# Patient Record
Sex: MALE | Race: BLACK OR AFRICAN AMERICAN | Employment: UNEMPLOYED | URBAN - METROPOLITAN AREA
[De-identification: names, ages, dates, MRNs, and addresses within clinical notes are randomized per-mention and may not be internally consistent; named-entity substitution may affect disease eponyms.]

---

## 2020-08-25 ENCOUNTER — APPOINTMENT (OUTPATIENT)
Dept: GENERAL RADIOLOGY | Age: 65
DRG: 871 | End: 2020-08-25

## 2020-08-25 ENCOUNTER — HOSPITAL ENCOUNTER (INPATIENT)
Age: 65
LOS: 3 days | Discharge: HOME | DRG: 871 | End: 2020-08-28
Attending: EMERGENCY MEDICINE | Admitting: INTERNAL MEDICINE

## 2020-08-25 ENCOUNTER — APPOINTMENT (OUTPATIENT)
Dept: CT IMAGING | Age: 65
DRG: 871 | End: 2020-08-25

## 2020-08-25 PROBLEM — R06.00 DYSPNEA: Status: ACTIVE | Noted: 2020-08-25

## 2020-08-25 LAB
ANION GAP SERPL CALCULATED.3IONS-SCNC: 12 MMOL/L (ref 3–16)
BASE EXCESS VENOUS: 2 MMOL/L (ref -2–3)
BASOPHILS ABSOLUTE: 0.1 K/UL (ref 0–0.2)
BASOPHILS RELATIVE PERCENT: 0.5 %
BUN BLDV-MCNC: 10 MG/DL (ref 7–20)
CALCIUM SERPL-MCNC: 9.6 MG/DL (ref 8.3–10.6)
CARBOXYHEMOGLOBIN: 2.3 % (ref 0–1.5)
CHLORIDE BLD-SCNC: 101 MMOL/L (ref 99–110)
CO2: 25 MMOL/L (ref 21–32)
CREAT SERPL-MCNC: 0.9 MG/DL (ref 0.8–1.3)
EKG ATRIAL RATE: 96 BPM
EKG DIAGNOSIS: NORMAL
EKG P AXIS: 81 DEGREES
EKG P-R INTERVAL: 132 MS
EKG Q-T INTERVAL: 368 MS
EKG QRS DURATION: 92 MS
EKG QTC CALCULATION (BAZETT): 464 MS
EKG R AXIS: -7 DEGREES
EKG T AXIS: 25 DEGREES
EKG VENTRICULAR RATE: 96 BPM
EOSINOPHILS ABSOLUTE: 0 K/UL (ref 0–0.6)
EOSINOPHILS RELATIVE PERCENT: 0.1 %
GFR AFRICAN AMERICAN: >60
GFR NON-AFRICAN AMERICAN: >60
GLUCOSE BLD-MCNC: 118 MG/DL (ref 70–99)
HCO3 VENOUS: 24.6 MMOL/L (ref 24–28)
HCT VFR BLD CALC: 45.9 % (ref 40.5–52.5)
HEMOGLOBIN, VEN, REDUCED: 0.7 %
HEMOGLOBIN: 15.7 G/DL (ref 13.5–17.5)
LACTIC ACID: 1.9 MMOL/L (ref 0.4–2)
LYMPHOCYTES ABSOLUTE: 2.8 K/UL (ref 1–5.1)
LYMPHOCYTES RELATIVE PERCENT: 13.8 %
MAGNESIUM: 1.9 MG/DL (ref 1.8–2.4)
MCH RBC QN AUTO: 32.6 PG (ref 26–34)
MCHC RBC AUTO-ENTMCNC: 34.2 G/DL (ref 31–36)
MCV RBC AUTO: 95.6 FL (ref 80–100)
METHEMOGLOBIN VENOUS: 0.4 % (ref 0–1.5)
MONOCYTES ABSOLUTE: 1.3 K/UL (ref 0–1.3)
MONOCYTES RELATIVE PERCENT: 6.5 %
NEUTROPHILS ABSOLUTE: 16.3 K/UL (ref 1.7–7.7)
NEUTROPHILS RELATIVE PERCENT: 79.1 %
O2 SAT, VEN: 99 %
PCO2, VEN: 33.9 MMHG (ref 41–51)
PDW BLD-RTO: 12.7 % (ref 12.4–15.4)
PH VENOUS: 7.47 (ref 7.35–7.45)
PLATELET # BLD: 226 K/UL (ref 135–450)
PMV BLD AUTO: 8.8 FL (ref 5–10.5)
PO2, VEN: 131 MMHG (ref 25–40)
POTASSIUM REFLEX MAGNESIUM: 3.5 MMOL/L (ref 3.5–5.1)
PRO-BNP: 197 PG/ML (ref 0–124)
RBC # BLD: 4.81 M/UL (ref 4.2–5.9)
SLIDE REVIEW: ABNORMAL
SODIUM BLD-SCNC: 138 MMOL/L (ref 136–145)
TCO2 CALC VENOUS: 26 MMOL/L
TROPONIN: <0.01 NG/ML
WBC # BLD: 20.6 K/UL (ref 4–11)

## 2020-08-25 PROCEDURE — 83605 ASSAY OF LACTIC ACID: CPT

## 2020-08-25 PROCEDURE — 6360000002 HC RX W HCPCS

## 2020-08-25 PROCEDURE — 2060000000 HC ICU INTERMEDIATE R&B

## 2020-08-25 PROCEDURE — C9113 INJ PANTOPRAZOLE SODIUM, VIA: HCPCS | Performed by: INTERNAL MEDICINE

## 2020-08-25 PROCEDURE — 71045 X-RAY EXAM CHEST 1 VIEW: CPT

## 2020-08-25 PROCEDURE — 86702 HIV-2 ANTIBODY: CPT

## 2020-08-25 PROCEDURE — 2580000003 HC RX 258: Performed by: STUDENT IN AN ORGANIZED HEALTH CARE EDUCATION/TRAINING PROGRAM

## 2020-08-25 PROCEDURE — 87390 HIV-1 AG IA: CPT

## 2020-08-25 PROCEDURE — 85025 COMPLETE CBC W/AUTO DIFF WBC: CPT

## 2020-08-25 PROCEDURE — 82803 BLOOD GASES ANY COMBINATION: CPT

## 2020-08-25 PROCEDURE — 2500000003 HC RX 250 WO HCPCS: Performed by: INTERNAL MEDICINE

## 2020-08-25 PROCEDURE — 36415 COLL VENOUS BLD VENIPUNCTURE: CPT

## 2020-08-25 PROCEDURE — 86701 HIV-1ANTIBODY: CPT

## 2020-08-25 PROCEDURE — U0003 INFECTIOUS AGENT DETECTION BY NUCLEIC ACID (DNA OR RNA); SEVERE ACUTE RESPIRATORY SYNDROME CORONAVIRUS 2 (SARS-COV-2) (CORONAVIRUS DISEASE [COVID-19]), AMPLIFIED PROBE TECHNIQUE, MAKING USE OF HIGH THROUGHPUT TECHNOLOGIES AS DESCRIBED BY CMS-2020-01-R: HCPCS

## 2020-08-25 PROCEDURE — 6360000002 HC RX W HCPCS: Performed by: INTERNAL MEDICINE

## 2020-08-25 PROCEDURE — 94664 DEMO&/EVAL PT USE INHALER: CPT

## 2020-08-25 PROCEDURE — 99285 EMERGENCY DEPT VISIT HI MDM: CPT

## 2020-08-25 PROCEDURE — 2580000003 HC RX 258: Performed by: INTERNAL MEDICINE

## 2020-08-25 PROCEDURE — 94150 VITAL CAPACITY TEST: CPT

## 2020-08-25 PROCEDURE — 83880 ASSAY OF NATRIURETIC PEPTIDE: CPT

## 2020-08-25 PROCEDURE — 6360000002 HC RX W HCPCS: Performed by: STUDENT IN AN ORGANIZED HEALTH CARE EDUCATION/TRAINING PROGRAM

## 2020-08-25 PROCEDURE — 71275 CT ANGIOGRAPHY CHEST: CPT

## 2020-08-25 PROCEDURE — 93005 ELECTROCARDIOGRAM TRACING: CPT | Performed by: STUDENT IN AN ORGANIZED HEALTH CARE EDUCATION/TRAINING PROGRAM

## 2020-08-25 PROCEDURE — 2580000003 HC RX 258

## 2020-08-25 PROCEDURE — 87205 SMEAR GRAM STAIN: CPT

## 2020-08-25 PROCEDURE — 80048 BASIC METABOLIC PNL TOTAL CA: CPT

## 2020-08-25 PROCEDURE — 6360000004 HC RX CONTRAST MEDICATION: Performed by: STUDENT IN AN ORGANIZED HEALTH CARE EDUCATION/TRAINING PROGRAM

## 2020-08-25 PROCEDURE — 0100U HC RESPIRPTHGN MULT REV TRANS & AMP PRB TECH 21 TRGT: CPT

## 2020-08-25 PROCEDURE — 6370000000 HC RX 637 (ALT 250 FOR IP)

## 2020-08-25 PROCEDURE — 87070 CULTURE OTHR SPECIMN AEROBIC: CPT

## 2020-08-25 PROCEDURE — U0002 COVID-19 LAB TEST NON-CDC: HCPCS

## 2020-08-25 PROCEDURE — 83735 ASSAY OF MAGNESIUM: CPT

## 2020-08-25 PROCEDURE — 84484 ASSAY OF TROPONIN QUANT: CPT

## 2020-08-25 RX ORDER — SODIUM CHLORIDE 9 MG/ML
INJECTION, SOLUTION INTRAVENOUS CONTINUOUS
Status: DISCONTINUED | OUTPATIENT
Start: 2020-08-25 | End: 2020-08-25

## 2020-08-25 RX ORDER — ACETAMINOPHEN 650 MG/1
650 SUPPOSITORY RECTAL EVERY 6 HOURS PRN
Status: DISCONTINUED | OUTPATIENT
Start: 2020-08-25 | End: 2020-08-28 | Stop reason: HOSPADM

## 2020-08-25 RX ORDER — DEXTROSE, SODIUM CHLORIDE, SODIUM LACTATE, POTASSIUM CHLORIDE, AND CALCIUM CHLORIDE 5; .6; .31; .03; .02 G/100ML; G/100ML; G/100ML; G/100ML; G/100ML
INJECTION, SOLUTION INTRAVENOUS CONTINUOUS
Status: DISCONTINUED | OUTPATIENT
Start: 2020-08-25 | End: 2020-08-28

## 2020-08-25 RX ORDER — SODIUM CHLORIDE 0.9 % (FLUSH) 0.9 %
10 SYRINGE (ML) INJECTION EVERY 12 HOURS SCHEDULED
Status: DISCONTINUED | OUTPATIENT
Start: 2020-08-25 | End: 2020-08-28 | Stop reason: HOSPADM

## 2020-08-25 RX ORDER — PANTOPRAZOLE SODIUM 40 MG/10ML
40 INJECTION, POWDER, LYOPHILIZED, FOR SOLUTION INTRAVENOUS 2 TIMES DAILY
Status: DISCONTINUED | OUTPATIENT
Start: 2020-08-25 | End: 2020-08-27

## 2020-08-25 RX ORDER — POLYETHYLENE GLYCOL 3350 17 G/17G
17 POWDER, FOR SOLUTION ORAL DAILY PRN
Status: DISCONTINUED | OUTPATIENT
Start: 2020-08-25 | End: 2020-08-28 | Stop reason: HOSPADM

## 2020-08-25 RX ORDER — ACETAMINOPHEN 325 MG/1
650 TABLET ORAL EVERY 6 HOURS PRN
Status: DISCONTINUED | OUTPATIENT
Start: 2020-08-25 | End: 2020-08-28 | Stop reason: HOSPADM

## 2020-08-25 RX ORDER — SODIUM CHLORIDE 0.9 % (FLUSH) 0.9 %
10 SYRINGE (ML) INJECTION PRN
Status: DISCONTINUED | OUTPATIENT
Start: 2020-08-25 | End: 2020-08-28 | Stop reason: HOSPADM

## 2020-08-25 RX ORDER — PROMETHAZINE HYDROCHLORIDE 25 MG/1
12.5 TABLET ORAL EVERY 6 HOURS PRN
Status: DISCONTINUED | OUTPATIENT
Start: 2020-08-25 | End: 2020-08-28 | Stop reason: HOSPADM

## 2020-08-25 RX ORDER — PANTOPRAZOLE SODIUM 40 MG/10ML
40 INJECTION, POWDER, LYOPHILIZED, FOR SOLUTION INTRAVENOUS DAILY
Status: DISCONTINUED | OUTPATIENT
Start: 2020-08-25 | End: 2020-08-25

## 2020-08-25 RX ORDER — ONDANSETRON 2 MG/ML
4 INJECTION INTRAMUSCULAR; INTRAVENOUS EVERY 6 HOURS PRN
Status: DISCONTINUED | OUTPATIENT
Start: 2020-08-25 | End: 2020-08-28 | Stop reason: HOSPADM

## 2020-08-25 RX ADMIN — PIPERACILLIN AND TAZOBACTAM 3.38 G: 3; .375 INJECTION, POWDER, LYOPHILIZED, FOR SOLUTION INTRAVENOUS at 17:02

## 2020-08-25 RX ADMIN — FOLIC ACID: 5 INJECTION, SOLUTION INTRAMUSCULAR; INTRAVENOUS; SUBCUTANEOUS at 17:06

## 2020-08-25 RX ADMIN — CEFEPIME HYDROCHLORIDE 1 G: 1 INJECTION, POWDER, FOR SOLUTION INTRAMUSCULAR; INTRAVENOUS at 12:48

## 2020-08-25 RX ADMIN — ENOXAPARIN SODIUM 40 MG: 40 INJECTION SUBCUTANEOUS at 15:58

## 2020-08-25 RX ADMIN — SODIUM CHLORIDE: 9 INJECTION, SOLUTION INTRAVENOUS at 15:58

## 2020-08-25 RX ADMIN — PANTOPRAZOLE SODIUM 40 MG: 40 INJECTION, POWDER, FOR SOLUTION INTRAVENOUS at 15:58

## 2020-08-25 RX ADMIN — IOPAMIDOL 80 ML: 755 INJECTION, SOLUTION INTRAVENOUS at 11:23

## 2020-08-25 RX ADMIN — SODIUM CHLORIDE, SODIUM LACTATE, POTASSIUM CHLORIDE, CALCIUM CHLORIDE AND DEXTROSE MONOHYDRATE: 5; 600; 310; 30; 20 INJECTION, SOLUTION INTRAVENOUS at 17:47

## 2020-08-25 RX ADMIN — VANCOMYCIN HYDROCHLORIDE 1000 MG: 10 INJECTION, POWDER, LYOPHILIZED, FOR SOLUTION INTRAVENOUS at 12:48

## 2020-08-25 RX ADMIN — ACETAMINOPHEN 650 MG: 325 TABLET ORAL at 20:06

## 2020-08-25 ASSESSMENT — PAIN DESCRIPTION - ORIENTATION: ORIENTATION: ANTERIOR

## 2020-08-25 ASSESSMENT — PAIN DESCRIPTION - LOCATION: LOCATION: HEAD

## 2020-08-25 ASSESSMENT — PAIN DESCRIPTION - PAIN TYPE: TYPE: ACUTE PAIN

## 2020-08-25 ASSESSMENT — PAIN SCALES - GENERAL
PAINLEVEL_OUTOF10: 0
PAINLEVEL_OUTOF10: 5

## 2020-08-25 NOTE — PROGRESS NOTES
4 Eyes Admission Assessment     I agree as the admission nurse that 2 RN's have performed a thorough Head to Toe Skin Assessment on the patient. ALL assessment sites listed below have been assessed on admission. Areas assessed by both nurses: Cedric Edwardsson  [x]   Head, Face, and Ears   [x]   Shoulders, Back, and Chest  [x]   Arms, Elbows, and Hands   [x]   Coccyx, Sacrum, and Ischum  [x]   Legs, Feet, and Heels        Does the Patient have Skin Breakdown?   No         Ceasar Prevention initiated:  No   Wound Care Orders initiated:  No      [facility] nurse consulted for Pressure Injury (Stage 3,4, Unstageable, DTI, NWPT, and Complex wounds) or Ceasar score 18 or lower:  No      Nurse 1 eSignature: Electronically signed by Gloria Rush RN on 8/25/20 at 4:59 PM EDT    **SHARE this note so that the co-signing nurse is able to place an eSignature**    Nurse 2 eSignature: Electronically signed by Miryam Rodriguez RN on 8/26/20 at 4:07 AM EDT

## 2020-08-25 NOTE — ED PROVIDER NOTES
Pulse: 99, Resp: 16, SpO2: 96 %   General:  Well appearing. No acute distress, appears cachetic  Eyes:  PERRL. No discharge from eyes   ENT:  No discharge from nose. OP clear  Neck:  Supple, trachea midline  Pulmonary:   Non-labored breathing. Breath sounds clear bilaterally  Cardiac:  Regular rate and rhythm. No murmurs. Midline chest wall tenderness to palpation. Abdomen:  Soft. Non-distended. Non-tender. Musculoskeletal:  No long bone deformity. Vascular:  Extremities warm and perfused. Normal pulses in all 4 extremities  Skin:  Dry, no rashes  Extremities:  No peripheral edema  Neuro: Alert. Moves all four extremities to command. Sensation grossly intact to light touch. Speech and mentation normal. No focal deficit. Gait narrow and stable. Diagnostic Results     EKG   Interpreted in conjunction with emergencydepartment physician Reilly Torres, *  Rhythm: normal sinus   Rate: normal, borderline tachy  Axis: normal  Ectopy: none  Conduction: normal  ST Segments: no acute change  T Waves:no acute change  Q Waves: none  Clinical Impression: non-specific EKG, no acute ischemia  Comparison:  No prior    RADIOLOGY:  CTA PULMONARY W CONTRAST   Final Result   1. No evidence of pulmonary embolus. 2. Scattered consolidative, groundglass and tree-in-bud nodular opacities. Thickening of the right lower lobe bronchi. Findings reflect bronchiolitis/pneumonitis. 3. Emphysema. 4. Mild wall thickening of the midesophagus. Correlate for esophagitis/GERD. XR CHEST PORTABLE   Final Result   Impression: Pneumonitis of the right lung base medially.           LABS:   Results for orders placed or performed during the hospital encounter of 08/25/20   CBC Auto Differential   Result Value Ref Range    WBC 20.6 (H) 4.0 - 11.0 K/uL    RBC 4.81 4.20 - 5.90 M/uL    Hemoglobin 15.7 13.5 - 17.5 g/dL    Hematocrit 45.9 40.5 - 52.5 %    MCV 95.6 80.0 - 100.0 fL    MCH 32.6 26.0 - 34.0 pg    MCHC 34.2 31.0 - 36.0 g/dL    RDW 12.7 12.4 - 15.4 %    Platelets 673 281 - 310 K/uL    MPV 8.8 5.0 - 10.5 fL    SLIDE REVIEW see below     Neutrophils % 79.1 %    Lymphocytes % 13.8 %    Monocytes % 6.5 %    Eosinophils % 0.1 %    Basophils % 0.5 %    Neutrophils Absolute 16.3 (H) 1.7 - 7.7 K/uL    Lymphocytes Absolute 2.8 1.0 - 5.1 K/uL    Monocytes Absolute 1.3 0.0 - 1.3 K/uL    Eosinophils Absolute 0.0 0.0 - 0.6 K/uL    Basophils Absolute 0.1 0.0 - 0.2 K/uL   Basic Metabolic Panel w/ Reflex to MG   Result Value Ref Range    Sodium 138 136 - 145 mmol/L    Potassium reflex Magnesium 3.5 3.5 - 5.1 mmol/L    Chloride 101 99 - 110 mmol/L    CO2 25 21 - 32 mmol/L    Anion Gap 12 3 - 16    Glucose 118 (H) 70 - 99 mg/dL    BUN 10 7 - 20 mg/dL    CREATININE 0.9 0.8 - 1.3 mg/dL    GFR Non-African American >60 >60    GFR African American >60 >60    Calcium 9.6 8.3 - 10.6 mg/dL   Troponin   Result Value Ref Range    Troponin <0.01 <0.01 ng/mL   Brain Natriuretic Peptide   Result Value Ref Range    Pro- (H) 0 - 124 pg/mL   Blood Gas, Venous   Result Value Ref Range    pH, Harish 7.474 (H) 7.350 - 7.450    pCO2, Harish 33.9 (L) 41.0 - 51.0 mmHg    pO2, Harish 131.0 (H) 25.0 - 40.0 mmHg    HCO3, Venous 24.6 24.0 - 28.0 mmol/L    Base Excess, Harish 2.0 -2.0 - 3.0 mmol/L    O2 Sat, Harish 99 Not established %    Carboxyhemoglobin 2.3 (H) 0.0 - 1.5 %    MetHgb, Harish 0.4 0.0 - 1.5 %    TC02 (Calc), Harish 26 mmol/L    Hemoglobin, Harish, Reduced 0.70 %   Lactic Acid, Plasma   Result Value Ref Range    Lactic Acid 1.9 0.4 - 2.0 mmol/L   Magnesium   Result Value Ref Range    Magnesium 1.90 1.80 - 2.40 mg/dL   EKG 12 Lead   Result Value Ref Range    Ventricular Rate 96 BPM    Atrial Rate 96 BPM    P-R Interval 132 ms    QRS Duration 92 ms    Q-T Interval 368 ms    QTc Calculation (Bazett) 464 ms    P Axis 81 degrees    R Axis -7 degrees    T Axis 25 degrees    Diagnosis       EKG performed in ER and to be interpreted by ER physician. Confirmed by MD, ER ([id number]), plans were discussed and agreed upon. Upon presentation, the patient was well appearing and had stable vitals. Although we do not have access to the [facility] [address] [facility] records, the patient states he had 1 x-ray performed during his inpatient stay and never a CT scan. Given his hemoptysis, borderline tachycardia, shortness of breath, pleuritic chest pain, lack of improvement with inhalers and significant smoking history, will obtain a CTPA to evaluate for pulmonary embolism, malignancy, pneumonia. CBC with leukocytosis to 20.6, BMP unremarkable, troponin negative, , mag normal.  VBG with mild respiratory alkalosis. Lactate normal.  COVID pending. Chest x-ray showed pneumonitis of the right lung base. CT PA negative for pulmonary embolus; scattered consolidative, groundglass and tree-in-bud nodular opacities. Thickening of the right lower lobe bronchi. Findings reflect bronchiolitis/pneumonitis, mild wall thickening of the midesophagus. Correlate for esophagitis/GERD. Spoke with the patient about his ability to care for himself. He states he lives alone and manages his own medications and he is felt very weak and is afraid of being able to handle this at home. He states he has had 20 pound weight loss over the last month, he is not eating well. Given his recent hospital admission, he is at risk for hospital-acquired pneumonia. Will cover him with broad-spectrum antibiotics Vanco and cefepime given his leukocytosis, significant shortness of breath and pneumonia picture. As he is weak and unable to care for himself at home, will admit him to medicine for further antibiotic management. Admit: At this time the patient has been admitted to medicine for further evaluation and management of pneumonia. The patient will continue to be monitored here in the emergency department until which time he is moved to his new treatment location.  Discussed with admitting team.    Medications received during this ED visit:    Medications   sodium chloride flush 0.9 % injection 10 mL (has no administration in time range)   sodium chloride flush 0.9 % injection 10 mL (has no administration in time range)   acetaminophen (TYLENOL) tablet 650 mg (has no administration in time range)     Or   acetaminophen (TYLENOL) suppository 650 mg (has no administration in time range)   polyethylene glycol (GLYCOLAX) packet 17 g (has no administration in time range)   promethazine (PHENERGAN) tablet 12.5 mg (has no administration in time range)     Or   ondansetron (ZOFRAN) injection 4 mg (has no administration in time range)   enoxaparin (LOVENOX) injection 40 mg (40 mg Subcutaneous Given 8/25/20 1558)   pantoprazole (PROTONIX) injection 40 mg (40 mg Intravenous Given 8/25/20 1558)   sodium chloride 0.9 % 50 mL with folic acid 1 mg, adult multi-vitamin with vitamin k 10 mL, thiamine 100 mg (has no administration in time range)   dextrose 5 % in lactated ringers infusion (has no administration in time range)   piperacillin-tazobactam (ZOSYN) 3.375 g in dextrose 5 % 100 mL IVPB extended infusion (mini-bag) (has no administration in time range)   iopamidol (ISOVUE-370) 76 % injection 80 mL (80 mLs Intravenous Given 8/25/20 1123)   vancomycin (VANCOCIN) 1,000 mg in dextrose 5 % 250 mL IVPB (0 mg Intravenous Stopped 8/25/20 1414)   cefepime (MAXIPIME) 1 g IVPB minibag (0 g Intravenous Stopped 8/25/20 1317)       Clinical Impression     1. Pneumonia due to organism        Disposition     PATIENT REFERRED TO:  No follow-up provider specified. DISCHARGE MEDICATIONS:  There are no discharge medications for this patient.       DISPOSITION Admitted 08/25/2020 12:47:02 PM       Felipe Knowles MD  Resident  08/25/20 2692

## 2020-08-25 NOTE — H&P
Internal Medicine PGY-1 Resident History & Physical      PCP: No primary care provider on file. Date of Admission: 8/25/2020    Date of Service: Pt seen/examined on 8/25/2020   Chief Complaint:  Chest pain and SOB    Of Present Illness:     Patient is a 63 y.o. male  With no PMHx presented to [facility] with CP and SOB after being discharged from the [place] for a 5-6 day hospital stay for similar complaint, he was scheduled by the [place] for an outpatient EGD. After being discharged from the [place] patient states that he had an episode of emesis. His CP is on his sternum and is worsened with a deep breath. Patient states that his SOB started 1.5 weeks ago and was improved after smoking crack cocaine. His SOB is accompanied by productive cough. And has a recent hx of 20lb weight loss. He has odynophagia. Denies F/C/N/dysphagia. When question the patient about sexual hx, he states that he has 2 different partners every week, and does not use protection, and that Brandy Calvert is bachelor. \"    Past Medical History:      No past medical history on file. Past Surgical History:      No past surgical history on file. Medications Prior to Admission:      Prior to Admission medications    Not on File       Allergies: Patient has no known allergies. Social History:        TOBACCO: smokes daily  ETOH:  Drinks daily  DRUG: marijuana, and crack cocaine use  SEXUAL Hx: regularly has unprotected sex      Family History:        No family history on file. OF SYSTEMS:   Pertinent positives as noted in the HPI. All other systems reviewed and negative. ROS: Review of Systems    PHYSICAL EXAM PERFORMED:    /82   Pulse 93   Temp 98.4 °F (36.9 °C) (Oral)   Resp 20   Ht 5' 2\" (1.575 m)   Wt 130 lb (59 kg)   SpO2 94%   BMI 23.78 kg/m²     Physical Exam  Constitutional:       General: He is awake. Appearance: He is cachectic. HENT:      Head: Normocephalic and atraumatic.    Cardiovascular:      Rate and Rhythm: Normal rate and regular rhythm. Heart sounds: No murmur. No friction rub. No gallop. Pulmonary:      Effort: Pulmonary effort is normal.      Breath sounds: Examination of the right-upper field reveals rhonchi. Examination of the left-upper field reveals rhonchi. Examination of the right-middle field reveals rhonchi. Examination of the left-middle field reveals rhonchi. Examination of the right-lower field reveals rhonchi. Examination of the left-lower field reveals rhonchi. Rhonchi present. No wheezing. Abdominal:      General: Abdomen is flat. Bowel sounds are normal.      Palpations: Abdomen is soft. Tenderness: There is abdominal tenderness in the right upper quadrant and left upper quadrant. Skin:     General: Skin is warm and dry. Nails: There is no clubbing. Neurological:      General: No focal deficit present. Mental Status: He is alert. Psychiatric:         Behavior: Behavior is cooperative. Labs:     Recent Labs     08/25/20  1050   WBC 20.6*   HGB 15.7   HCT 45.9        Recent Labs     08/25/20  1050      K 3.5      CO2 25   BUN 10   CREATININE 0.9   CALCIUM 9.6     No results for input(s): AST, ALT, BILIDIR, BILITOT, ALKPHOS in the last 72 hours. No results for input(s): INR in the last 72 hours. Recent Labs     08/25/20  1050   TROPONINI <0.01       Urinalysis:   No results found for: Sourav Katos, BACTERIA, RBCUA, BLOODU, SPECGRAV, GLUCOSEU    Radiology:     CTA PULMONARY W CONTRAST   Final Result   1. No evidence of pulmonary embolus. 2. Scattered consolidative, groundglass and tree-in-bud nodular opacities. Thickening of the right lower lobe bronchi. Findings reflect bronchiolitis/pneumonitis. 3. Emphysema. 4. Mild wall thickening of the midesophagus. Correlate for esophagitis/GERD. XR CHEST PORTABLE   Final Result   Impression: Pneumonitis of the right lung base medially.           ASSESSMENT & PLAN:  Active Hospital Problems

## 2020-08-26 ENCOUNTER — ANESTHESIA EVENT (OUTPATIENT)
Dept: ENDOSCOPY | Age: 65
DRG: 871 | End: 2020-08-26

## 2020-08-26 ENCOUNTER — APPOINTMENT (OUTPATIENT)
Dept: GENERAL RADIOLOGY | Age: 65
DRG: 871 | End: 2020-08-26

## 2020-08-26 LAB
ANION GAP SERPL CALCULATED.3IONS-SCNC: 11 MMOL/L (ref 3–16)
BASOPHILS ABSOLUTE: 0.1 K/UL (ref 0–0.2)
BASOPHILS RELATIVE PERCENT: 0.5 %
BUN BLDV-MCNC: 9 MG/DL (ref 7–20)
CALCIUM SERPL-MCNC: 9.4 MG/DL (ref 8.3–10.6)
CHLORIDE BLD-SCNC: 104 MMOL/L (ref 99–110)
CO2: 23 MMOL/L (ref 21–32)
CREAT SERPL-MCNC: 0.9 MG/DL (ref 0.8–1.3)
EOSINOPHILS ABSOLUTE: 0.2 K/UL (ref 0–0.6)
EOSINOPHILS RELATIVE PERCENT: 1.3 %
GFR AFRICAN AMERICAN: >60
GFR NON-AFRICAN AMERICAN: >60
GLUCOSE BLD-MCNC: 115 MG/DL (ref 70–99)
HCT VFR BLD CALC: 47.5 % (ref 40.5–52.5)
HEMOGLOBIN: 16.1 G/DL (ref 13.5–17.5)
LYMPHOCYTES ABSOLUTE: 2.4 K/UL (ref 1–5.1)
LYMPHOCYTES RELATIVE PERCENT: 17.2 %
MCH RBC QN AUTO: 32.4 PG (ref 26–34)
MCHC RBC AUTO-ENTMCNC: 33.9 G/DL (ref 31–36)
MCV RBC AUTO: 95.7 FL (ref 80–100)
MONOCYTES ABSOLUTE: 1.1 K/UL (ref 0–1.3)
MONOCYTES RELATIVE PERCENT: 7.7 %
NEUTROPHILS ABSOLUTE: 10.4 K/UL (ref 1.7–7.7)
NEUTROPHILS RELATIVE PERCENT: 73.3 %
ORGANISM: ABNORMAL
PDW BLD-RTO: 12.8 % (ref 12.4–15.4)
PLATELET # BLD: 232 K/UL (ref 135–450)
PMV BLD AUTO: 8.7 FL (ref 5–10.5)
POTASSIUM REFLEX MAGNESIUM: 3.7 MMOL/L (ref 3.5–5.1)
RBC # BLD: 4.96 M/UL (ref 4.2–5.9)
REPORT: NORMAL
RESPIRATORY PANEL PCR: ABNORMAL
SARS-COV-2, PCR: NOT DETECTED
SODIUM BLD-SCNC: 138 MMOL/L (ref 136–145)
WBC # BLD: 14.2 K/UL (ref 4–11)

## 2020-08-26 PROCEDURE — C9113 INJ PANTOPRAZOLE SODIUM, VIA: HCPCS | Performed by: INTERNAL MEDICINE

## 2020-08-26 PROCEDURE — 80048 BASIC METABOLIC PNL TOTAL CA: CPT

## 2020-08-26 PROCEDURE — 2060000000 HC ICU INTERMEDIATE R&B

## 2020-08-26 PROCEDURE — 2580000003 HC RX 258

## 2020-08-26 PROCEDURE — 85025 COMPLETE CBC W/AUTO DIFF WBC: CPT

## 2020-08-26 PROCEDURE — 74230 X-RAY XM SWLNG FUNCJ C+: CPT

## 2020-08-26 PROCEDURE — 6370000000 HC RX 637 (ALT 250 FOR IP): Performed by: STUDENT IN AN ORGANIZED HEALTH CARE EDUCATION/TRAINING PROGRAM

## 2020-08-26 PROCEDURE — 92611 MOTION FLUOROSCOPY/SWALLOW: CPT | Performed by: SPEECH-LANGUAGE PATHOLOGIST

## 2020-08-26 PROCEDURE — 2580000003 HC RX 258: Performed by: INTERNAL MEDICINE

## 2020-08-26 PROCEDURE — 6370000000 HC RX 637 (ALT 250 FOR IP)

## 2020-08-26 PROCEDURE — 92610 EVALUATE SWALLOWING FUNCTION: CPT | Performed by: SPEECH-LANGUAGE PATHOLOGIST

## 2020-08-26 PROCEDURE — 6360000002 HC RX W HCPCS

## 2020-08-26 PROCEDURE — 6360000002 HC RX W HCPCS: Performed by: INTERNAL MEDICINE

## 2020-08-26 RX ADMIN — SODIUM CHLORIDE, SODIUM LACTATE, POTASSIUM CHLORIDE, CALCIUM CHLORIDE AND DEXTROSE MONOHYDRATE: 5; 600; 310; 30; 20 INJECTION, SOLUTION INTRAVENOUS at 21:42

## 2020-08-26 RX ADMIN — SODIUM CHLORIDE, SODIUM LACTATE, POTASSIUM CHLORIDE, CALCIUM CHLORIDE AND DEXTROSE MONOHYDRATE: 5; 600; 310; 30; 20 INJECTION, SOLUTION INTRAVENOUS at 04:12

## 2020-08-26 RX ADMIN — CHLORASEPTIC 1 SPRAY: 1.5 LIQUID ORAL at 21:07

## 2020-08-26 RX ADMIN — Medication 10 ML: at 09:06

## 2020-08-26 RX ADMIN — PANTOPRAZOLE SODIUM 40 MG: 40 INJECTION, POWDER, FOR SOLUTION INTRAVENOUS at 09:06

## 2020-08-26 RX ADMIN — ENOXAPARIN SODIUM 40 MG: 40 INJECTION SUBCUTANEOUS at 09:05

## 2020-08-26 RX ADMIN — PIPERACILLIN AND TAZOBACTAM 3.38 G: 3; .375 INJECTION, POWDER, LYOPHILIZED, FOR SOLUTION INTRAVENOUS at 00:38

## 2020-08-26 RX ADMIN — SODIUM CHLORIDE, SODIUM LACTATE, POTASSIUM CHLORIDE, CALCIUM CHLORIDE AND DEXTROSE MONOHYDRATE: 5; 600; 310; 30; 20 INJECTION, SOLUTION INTRAVENOUS at 13:51

## 2020-08-26 RX ADMIN — PANTOPRAZOLE SODIUM 40 MG: 40 INJECTION, POWDER, FOR SOLUTION INTRAVENOUS at 21:07

## 2020-08-26 RX ADMIN — ACETAMINOPHEN 650 MG: 325 TABLET ORAL at 16:34

## 2020-08-26 RX ADMIN — PIPERACILLIN AND TAZOBACTAM 3.38 G: 3; .375 INJECTION, POWDER, LYOPHILIZED, FOR SOLUTION INTRAVENOUS at 09:05

## 2020-08-26 RX ADMIN — Medication 10 ML: at 21:08

## 2020-08-26 RX ADMIN — PIPERACILLIN AND TAZOBACTAM 3.38 G: 3; .375 INJECTION, POWDER, LYOPHILIZED, FOR SOLUTION INTRAVENOUS at 16:34

## 2020-08-26 RX ADMIN — BENZOCAINE AND MENTHOL 1 LOZENGE: 15; 3.6 LOZENGE ORAL at 04:11

## 2020-08-26 ASSESSMENT — PAIN SCALES - GENERAL
PAINLEVEL_OUTOF10: 0
PAINLEVEL_OUTOF10: 3
PAINLEVEL_OUTOF10: 0

## 2020-08-26 NOTE — PROGRESS NOTES
NUTRITION ASSESSMENT  Admission Date: 8/25/2020     Type and Reason for Visit: Initial, Positive Nutrition Screen    NUTRITION RECOMMENDATIONS:   **NPO- monitor ability to advance po diet vs need for alternative nutrition    NUTRITION ASSESSMENT:  Patient is currently NPO, completed MBS this afternoon and plans for EGD tomorrow. SLP recommended regular diet with thin liquids. Will continue to monitor nutritional status and need for intervention. MALNUTRITION ASSESSMENT  Context of Malnutrition: Acute Illness   Malnutrition Status: At risk for malnutrition (Comment)  Findings of the 6 clinical characteristics of malnutrition (Minimum of 2 out of 6 clinical characteristics is required to make the diagnosis of moderate or severe Protein Calorie Malnutrition based on AND/ASPEN Guidelines):  Energy Intake: Unable to Assess    Energy Intake Time: Unable to assess   Weight Loss %: Unable to assess    Weight loss Time: Unable to assess   Body Fat Loss: No significant loss    Body Fat Location: Buccal region and No Significant   Body Muscle Loss: No significant loss    Body Muscle Loss Location: No significant    Fluid Accumulation: No significant    Fluid Accumulation Location: No significant     Strength: Not Performed; Not Measured       Problem: Problem #1: Inadequate oral intake  Etiology: Acute or chronic injury or trauma  Signs & Symptoms: NPO status due to medical condition    [address] [facility] [address] and/or Nutrient Delivery:Continue NPO  Nutrition education/counseling/coordination of care: Continue Inpatient Monitoring     NUTRITION MONITORING & EVALUATION:  Evaluation:Goals set   Goals:Goals: Patient will tolerate diet advancement and consume 50% or greater of all meals and supplements  Monitoring: Meal Intake  or Pertinent Labs      OBJECTIVE DATA:  · Nutrition-Focused Physical Findings: No BM recorded  · Wounds None      No past medical history on file.      ANTHROPOMETRICS  Current Height: 5' 2\" (157.5 cm)  Current Weight: 130 lb (59 kg)    Admission weight: 130 lb (59 kg)  Ideal Bodyweight 110 lb   Usual Bodyweight *PERLA    BMI BMI (Calculated): 23.8    Wt Readings from Last 50 Encounters:   08/25/20 130 lb (59 kg)     COMPARATIVE STANDARDS  Estimated Total Kcals/Day : 25-30  Current Bodyweight (59 kg) 1767-6131 kcal    Estimated Total Protein (g/day) : 1.2-1.4 Current Bodyweight (59 kg) 70-82 g/day  Estimated Daily Total Fluid (ml/day): 9063-4736 mL per day     Food / Nutrition-Related History  Pre-Admission / Home Diet:  Pre-Admission/Home Diet: General   Home Supplements / Herbals:    none noted  Food Restrictions / Cultural Requests:    none noted    Diet Orders / Intake / Nutrition Support  Current diet/supplement order: Diet NPO Effective Now  Diet NPO, After Midnight     NSG Recorded PO:   PO Fluids P.O.: 0 mL(ice chips)  PO Meals PO Meals Eaten (%): 0(NPO)   PO Intake: NPO  PO Supplement: NPO   PO Supplement Intake: NPO  IVF: N/A ml/hr     NUTRITION RISK LEVEL: Risk Level:  Moderate     Amira Dos Santos, [address] [place] [address]  [place]:  [phone]  Office:  [phone]

## 2020-08-26 NOTE — PROCEDURES
INSTRUMENTAL SWALLOW REPORT  MODIFIED BARIUM SWALLOW- Discharge    NAME: Drea Villafuerte   : 1955  MRN: [id number]       Date of Eval: 2020     Ordering Physician: Dr. Tiffany Medina  Radiologist: Dr. Vlad Casas     Referring Diagnosis(es): Referring Diagnosis: PNA    Past Medical History:  has no past medical history on file. Past Surgical History:  has no past surgical history on file. Current Diet Solid Consistency: Regular  Current Diet Liquid Consistency: Thin    Date of Prior Study: NA  Type of Study: Initial MBS  Results of Prior Study: NA    Recent CXR/CT of Chest: Date 20  Impression    1. No evidence of pulmonary embolus. 2. Scattered consolidative, groundglass and tree-in-bud nodular opacities. Thickening of the right lower lobe bronchi. Findings reflect bronchiolitis/pneumonitis. 3. Emphysema. 4. Mild wall thickening of the midesophagus. Correlate for esophagitis/GERD. BSE Impression 20  Dysphagia Impression : Given pt's complaints of odynophagia, globus sensation, and food sticking, along w/ concern for esophageal deficits, aspiration pneumonia, and onset of aggressive weight loss, pt would benefit from instrumental evaluation to fully assess swallow physiology and safest diet consistency. Upon bedside, pt noted to have no overt s/s of aspiration and complete clearance of oral cavity. Should MBS demonstrate functional swallow, recommend further evaluation by GI, who already have EGD scheduled for tomorrow, 20. Patient Complaints/Reason for Referral:  Drea Villafuerte was referred for a MBS to assess the efficiency of his/her swallow function, assess for aspiration, and to make recommendations regarding safe dietary consistencies, effective compensatory strategies, and safe eating environment.   Patient complaints: Odynophagia; cough    Onset of problem:   Date of Onset: 20    General Comment  Comments: pmh hx of tobacco abuse, alcohol abuse, crack cocaine abuse, and hx of multiple sexual partners presented to [facility] with CP and SOB. Odynophagia 2/2 CMV esophagitis vs candidal esphagitis; possible neoplasm. Concern for possible aspiration pneumonia. Subjective  Subjective: Pt AAOx4 and agreeable to evaluation. Able to follow all directions. Continued odynophagia, specifically w/ thin liquids. Behavior/Cognition/Vision/Hearing:  Behavior/Cognition: Alert; Cooperative  Vision: Within Functional Limits(Noted to have granulation tissue on R lateral eye; did not impact vision)  Hearing: Within functional limits    Impressions:  Treatment Dx and ICD 10: R13.12   Patient Position: Lateral and Patient Degrees: 90*    Consistencies Administered: Reg solid; Dysphagia Pureed (Dysphagia I); Thin cup; Thin teaspoon; Thin straw;Nectar  teaspoon    Compensatory Swallowing Strategies Attempted: Upright as possible for all oral intake;Effortful swallow  Postural Changes and/or Swallow Maneuvers Trialed: Upright    Oral Phase: Oral phase grossly WFLs; adequate bolus cohesion and A&P propulsion. Noted to have mild BOT and vallecular residue that pt was sensate to, and independently completed secondary swallow which cleared all residue. Again demonstrated mildly prolonged mastication, however, pt endorsed this was baseline. Pharyngeal: Pharyngeal phase grossly WFLs; adequate hyolaryngeal mechanics and pharyngeal constriction, resulting in complete clearance of pharynx/larynx. No pharyngeal residue observed other than x1 instance of trace puree residue on UES; suspect d/t possible irritation/edema, though anatomical abnormalities were not noted on fluero. No CP bar or hypertrophic opening. No penetration or aspiration observed w/ any consistency.     Upper Esophageal Screen: Pt noted to have x1 instance of trace puree residue on UES; suspect mildly reduced UES opening d/t possible irritation/edema; no anatomical abnormalities noted during fluero    Dysphagia Outcome Severity instrumental swallowing procedure      Oral Preparation / Oral Phase  Oral Phase: WFL    Pharyngeal Phase  Pharyngeal Phase: WFL    Esophageal Phase  Esophageal Screen: Impaired  Upper Esophageal Screen- Major Contributing Deficits  Reduced Cricopharyngeal Opening: Puree    Pain   Patient Currently in Pain: Denies  Pain Level: 0  Pain Type: Acute pain  Pain Location: Head      Therapy Time:   Individual Concurrent Group Co-treatment   Time In 1420         Time Out 1445         Minutes 25            Timed Code Treatment Minutes: 0 Minutes  Total Treatment Time: 22    Thank you,    Gurjit Davalos) [place], [place], [id number] [place] [address]; [phone]  Speech-Language Pathologist

## 2020-08-26 NOTE — CONSULTS
encounter. He reports occasional NSAID use (few times/month). No medications on file, no [facility] available at this time. Allergies  No Known Allergies   Social   Social History     Tobacco Use    Smoking status: Not on file   Substance Use Topics    Alcohol use: Not on file   He reports a 50 pack year smoking history. He drinks 2 cans of beer/day; no history of withdrawal or seizures. No family history on file. Review of Systems  As above       Physical Exam    /64   Pulse 82   Temp 97.8 °F (36.6 °C) (Oral)   Resp 20   Ht 5' 2\" (1.575 m)   Wt 130 lb (59 kg)   SpO2 92%   BMI 23.78 kg/m²   General appearance: alert, cooperative, no distress, appears stated age  Anicteric, No Jaundice  HEENT: Normocephalic, without obvious abnormality, oral mucosa moist with no appreciable lesions. Dentition fair. No appreciable cervical lymphadenopathy. Lungs: clear to auscultation bilaterally with wheezing and prolonged expiration phase, Normal Effort. On RA. Heart: regular rate and rhythm, normal S1 and S2, no murmurs or rubs. Tenderness to palpation at sternum (~3rd rib)  Abdomen: soft, mild diffuse tenderness, no overt masses. BS+, no fluid shifts or significant distension. Extremities: atraumatic, no cyanosis or edema  Skin: warm and dry  Neuro: grossly intact  AAOX3      Data Review:    Recent Labs     08/25/20  1050 08/26/20  0400   WBC 20.6* 14.2*   HGB 15.7 16.1   HCT 45.9 47.5   MCV 95.6 95.7    232     Recent Labs     08/25/20  1050 08/26/20  0400    138   K 3.5 3.7    104   CO2 25 23   BUN 10 9   CREATININE 0.9 0.9     Microbiology:   - HIV pending  - Respiratory panel pending  - Respiratory culture pending    Imaging Studies:                CTA PULMONARY W CONTRAST   Final Result   1. No evidence of pulmonary embolus. 2. Scattered consolidative, groundglass and tree-in-bud nodular opacities. Thickening of the right lower lobe bronchi.  Findings reflect bronchiolitis/pneumonitis. 3. Emphysema. 4. Mild wall thickening of the midesophagus. Correlate for esophagitis/GERD. XR CHEST PORTABLE   Final Result   Impression: Pneumonitis of the right lung base medially. Assessment:     Active Problems:    Dyspnea  Resolved Problems:    * No resolved hospital problems. *    Odynophagia and Dysphagia 2/2 Esophagitis vs. Esophageal Mass - given symptoms and history likely infectious esophagitis; HIV pending. Pneumonitis vs. Aspiration Pneumonia on Zosyn    Recommendations:     - plan for EGD tomorrow, pending respiratory status  - NPO at midnight  - continue protonix IV 40 mg BID  - follow up on HIV     Thank you for the opportunity to participate in [address] [place] [address]. Patient was discussed with Dr. Armin Suarez. Judy Bernal MD  PGY-1, Internal Medicine  Contact via [employer]    Patient was in isolation when I spoke to him. Agree with note. Pending resp status can consider EGD tomorrow if stable, if still wheezing will need to wait. PPI for now.  Since infectious esophagitis in differential HIV pending

## 2020-08-26 NOTE — PLAN OF CARE
Problem: Gas Exchange - Impaired  Goal: Absence of hypoxia  8/26/2020 0218 by Alex Benson RN  Outcome: So far this shift, pt has maintained SpO2 > 92% on room air with respiratory rate 18-20 breaths/min and no complaints of shortness of breath/dyspnea. Will continue to monitor. Problem: Falls - Risk of:  Goal: Will remain free from falls  Description: Will remain free from falls  Outcome: Pt will remain free from accidental injury this shift. Pt has fall risk measures (fall risk bracelet, fall risk sign, fall risk cloth, non-slip socks, dome light on) in place. Pt bed is in low position, bed alarm on, bed wheels locked, call light within reach, bedside table within reach, chair wheels locked, chair alarm on. Problem: Pain:  Goal: Pain level will decrease  Description: Pain level will decrease  Outcome: Pt describes no pain at rest, only pain when coughing or deep breathing. Pain described as a \"soreness\".

## 2020-08-26 NOTE — FLOWSHEET NOTE
08/26/20 6272   Encounter Summary   Services provided to: Patient   Referral/Consult From: Patient   Continue Visiting   (es 8/26)   Complexity of Encounter High   Length of Encounter 1 hour   Advance Care Planning Yes   Advance Directives (For Healthcare)   Healthcare Directive Yes, patient has an advance directive for healthcare treatment   Type of Healthcare Directive Durable power of  for health care   Copy in Chart Yes, copy in chart   Information on Marcie Agent's Name Juliet San Agent's Phone Number [phone]   If you are unable to speak for yourself, does your Healthcare Agent or Legal Spokesperson know your healthcare wishes? Yes   Patient was alert and oriented X3. He completed [id number] [email or web address] [facility] of  paperwork. He named his daughter, Edin Ochoa as his agent (tel [phone]). He named his sister, [place] as his first alternate agent (tel [phone]). Patient signed the document. His signature was witnessed and document signed by Gisela Foster and Gudelia Cadena.

## 2020-08-26 NOTE — PLAN OF CARE
Problem: Gas Exchange - Impaired  Goal: Absence of hypoxia  Outcome: Ongoing  Note: Thus far this shift, pt has maintained SpO2 > 92% on room air with respiratory rate 18-20 breaths/min and no complaints of shortness of breath/dyspnea. Will continue to monitor.

## 2020-08-26 NOTE — PLAN OF CARE
Bedside swallow evaluation completed. Please refer to EMR.     Thank you,    Abeba Chatterjee) [place], [place], [address] [facility]; [id number]  Speech-Language Pathologist
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
## 2020-08-26 NOTE — PROGRESS NOTES
Internal Medicine PGY-1 Resident Progress Note        PCP: No primary care provider on file. Date of Admission: 8/25/2020    Chief Complaint: Chest pain and SOB    Interval History:    Patient is lying in bed. No acute events overnight. Denies any new complaints. States he still has odynophagia, cough with yellow sputum and Non-cardiad sternal CP. Denies F/C/N/VSOB. Medications:  Reviewed  Medications    dextrose 5% in lactated ringers 100 mL/hr at 08/26/20 0412     Scheduled Medications    sodium chloride flush  10 mL Intravenous 2 times per day    enoxaparin  40 mg Subcutaneous Daily    pantoprazole  40 mg Intravenous BID    piperacillin-tazobactam  3.375 g Intravenous Q8H     PRN Meds: sodium chloride flush, acetaminophen **OR** acetaminophen, polyethylene glycol, promethazine **OR** ondansetron      Intake/Output Summary (Last 24 hours) at 8/26/2020 1021  Last data filed at 8/26/2020 0337  Gross per 24 hour   Intake 1505 ml   Output 850 ml   Net 655 ml       Physical Exam Performed:    /60   Pulse 87   Temp 97.9 °F (36.6 °C) (Oral)   Resp 20   Ht 5' 2\" (1.575 m)   Wt 130 lb (59 kg)   SpO2 93%   BMI 23.78 kg/m²     Physical Exam  Constitutional:       General: He is awake. Appearance: He is cachectic. HENT:      Head: Normocephalic and atraumatic. Eyes:      General: Lids are normal.      Conjunctiva/sclera:      Right eye: Right conjunctiva is injected. Left eye: Left conjunctiva is injected. Cardiovascular:      Rate and Rhythm: Normal rate and regular rhythm. Heart sounds: No murmur. No friction rub. No gallop. Pulmonary:      Breath sounds: Examination of the right-upper field reveals rhonchi. Examination of the left-upper field reveals rhonchi. Examination of the right-middle field reveals rhonchi. Examination of the left-middle field reveals rhonchi. Examination of the right-lower field reveals rhonchi. Examination of the left-lower field reveals rhonchi. positive for rhinovirus/ enterovirus  -respiratory cultures: 4+ WBC, 1+ gram pos rods  -start zosyn 3.375g IV q8h, for aspiration PNA   -Cefepime 1g IV once     Odynophagia 2/2 CMV esophagitis vs candidal esphagitis   -Patient has a long hx of smoking and ETOH abuse, and a a recent 20lb weight loss, possible esophageal neoplasm  -GI will EGD tomorrow, NPO at midnight  - pending HIV panel to r/o HIV infection due to patients risky sexual hx, possibly causing CMV or candidal esophagitis.    - speech evaluation for swallow study planned after EGD     Tobacco abuse  -  patient on cessation  - nicotine patch if needed     ETOH abuse  -monitor signs of withdrawal   - on cessation     Crack cocaine abuse  Patient endorses CP, trop <0.01, not likely cardiac in origin  - patient on cessation        DVT Prophylaxis: Lovenox 40mg daily IV  GI PPX: Protonix 40mg IV BID  Diet: Diet NPO Effective Now,   Code Status: Full Code        Dispo - inpatient     I will discuss the patient with the senior resident and MD Sanchez Kirby DO  Internal Medicine Resident, PGY-1

## 2020-08-26 NOTE — CARE COORDINATION
Case Management Assessment           Initial Evaluation                Date / Time of Evaluation: 8/26/2020 12:54 PM                 Assessment Completed by: Noni Smith    Patient Name: Ziyad Lucas     YOB: 1955  Diagnosis: Dyspnea [R06.00]  Dyspnea [R06.00]     Date / Time: 8/25/2020 10:19 AM    Patient Admission Status: Inpatient    If patient is discharged prior to next notation, then this note serves as note for discharge by case management. Current PCP: No primary care provider on file. Clinic Patient: No    Chart Reviewed: Yes  Patient/ Family Interviewed: Yes    Initial assessment completed at bedside with: patient    Hospitalization in the last 30 days: No    Emergency Contacts:  Extended Emergency Contact Information  Primary Emergency Contact: Joe Mcneill [address] Phone: [phone]  Relation: Child  Secondary Emergency Contact: Clarissa Jain, [address] Phone: [phone]  Relation: Brother/Sister    Advance Directives:   Code Status: Full Code      Financial  Payor: 'S ADMINISTRATION / Plan: 'S ADMINISTRATION / Product Type: *No Product type* /     Pre-cert required for SNF: Yes    Pharmacy  No Pharmacies Listed    Potential assistance Purchasing Medications:    Does Patient want to participate in local refill/ meds to beds program?:      Meds To Beds General Rules:  1. Can ONLY be done Monday- Friday between 8:30am-5pm  2. Prescription(s) must be in pharmacy by 3pm to be filled same day  3. Copy of patient's insurance/ prescription drug card and patient face sheet must be sent along with the prescription(s)  4. Cost of Rx cannot be added to hospital bill. If financial assistance is needed, please contact unit  or ;  or  CANNOT provide pharmacy voucher for patients co-pays  5.  Patients can then  the prescription on their way out of the hospital at discharge, or pharmacy can deliver to the bedside if staff is available. (payment due at time of pick-up or delivery - cash, check, or card accepted)     Able to afford home medications/ co-pay costs: Yes    ADLS  Support Systems:      PT AM-PAC:   /24  OT AM-PAC:   /24    [place]: from home alone  Steps:     Plans to RETURN to current housing: Yes  Barriers to RETURNING to current housing: none    Khushbu Esparza 80  Currently ACTIVE with [address]: No  Home Care Agency: Not Applicable        Durable Medical Equipment  DME Provider: n/a  Equipment: n/a    Home Oxygen and [address] [place] prior to admission: No  [address]: Not Applicable      Dialysis  Active with HD/PD prior to admission: No  Nephrologist:     HD Center:  Not Applicable    DISCHARGE PLAN:  Disposition: Home- No Services Needed    Transportation PLAN for discharge: friend     Factors facilitating achievement of predicted outcomes: Family support    Barriers to discharge: Medical complications    Additional Case Management Notes:   Patient is from home alone, independent pta. No CM needs at this time. The Plan for Transition of Care is related to the following treatment goals of Dyspnea [R06.00]  Dyspnea [R06.00]    The Patient and/or patient representative Paola Hsu and his family were provided with a choice of provider and agrees with the discharge plan Yes    Freedom of choice list was provided with basic dialogue that supports the patient's individualized plan of care/goals and shares the quality data associated with the providers.  Yes    Care Transition patient: No    Lorraine Kumar RN  [facility] [employer]  Case Management Department  Ph: [phone]   Fax: [phone]

## 2020-08-26 NOTE — PLAN OF CARE
Nutrition Problem #1: Inadequate oral intake  Intervention: Food and/or Nutrient Delivery: Continue NPO  Nutritional Goals: Patient will tolerate diet advancement and consume 50% or greater of all meals and supplements

## 2020-08-26 NOTE — PROGRESS NOTES
SLP evaluation ordered for possible aspiration pn. Plan for EGD tomorrow. Will page attending to see if pt needs to be NPO today pending the SLP.

## 2020-08-27 ENCOUNTER — ANESTHESIA (OUTPATIENT)
Dept: ENDOSCOPY | Age: 65
DRG: 871 | End: 2020-08-27

## 2020-08-27 VITALS
SYSTOLIC BLOOD PRESSURE: 108 MMHG | OXYGEN SATURATION: 98 % | RESPIRATION RATE: 21 BRPM | DIASTOLIC BLOOD PRESSURE: 66 MMHG

## 2020-08-27 LAB
ANION GAP SERPL CALCULATED.3IONS-SCNC: 11 MMOL/L (ref 3–16)
BASOPHILS ABSOLUTE: 0.1 K/UL (ref 0–0.2)
BASOPHILS RELATIVE PERCENT: 1 %
BUN BLDV-MCNC: 5 MG/DL (ref 7–20)
CALCIUM SERPL-MCNC: 9 MG/DL (ref 8.3–10.6)
CHLORIDE BLD-SCNC: 106 MMOL/L (ref 99–110)
CO2: 21 MMOL/L (ref 21–32)
CREAT SERPL-MCNC: 0.9 MG/DL (ref 0.8–1.3)
EOSINOPHILS ABSOLUTE: 0.1 K/UL (ref 0–0.6)
EOSINOPHILS RELATIVE PERCENT: 1.3 %
GFR AFRICAN AMERICAN: >60
GFR NON-AFRICAN AMERICAN: >60
GLUCOSE BLD-MCNC: 111 MG/DL (ref 70–99)
GLUCOSE BLD-MCNC: 117 MG/DL (ref 70–99)
HCT VFR BLD CALC: 44.1 % (ref 40.5–52.5)
HEMOGLOBIN: 15.1 G/DL (ref 13.5–17.5)
HIV AG/AB: NORMAL
HIV ANTIGEN: NORMAL
HIV-1 ANTIBODY: NORMAL
HIV-2 AB: NORMAL
LYMPHOCYTES ABSOLUTE: 2.4 K/UL (ref 1–5.1)
LYMPHOCYTES RELATIVE PERCENT: 22.4 %
MAGNESIUM: 2 MG/DL (ref 1.8–2.4)
MCH RBC QN AUTO: 32.5 PG (ref 26–34)
MCHC RBC AUTO-ENTMCNC: 34.4 G/DL (ref 31–36)
MCV RBC AUTO: 94.6 FL (ref 80–100)
MONOCYTES ABSOLUTE: 1 K/UL (ref 0–1.3)
MONOCYTES RELATIVE PERCENT: 9.5 %
NEUTROPHILS ABSOLUTE: 7 K/UL (ref 1.7–7.7)
NEUTROPHILS RELATIVE PERCENT: 65.8 %
PDW BLD-RTO: 12.5 % (ref 12.4–15.4)
PERFORMED ON: ABNORMAL
PLATELET # BLD: 245 K/UL (ref 135–450)
PMV BLD AUTO: 8.5 FL (ref 5–10.5)
POTASSIUM REFLEX MAGNESIUM: 3.5 MMOL/L (ref 3.5–5.1)
RBC # BLD: 4.66 M/UL (ref 4.2–5.9)
SODIUM BLD-SCNC: 138 MMOL/L (ref 136–145)
WBC # BLD: 10.7 K/UL (ref 4–11)

## 2020-08-27 PROCEDURE — 7100000011 HC PHASE II RECOVERY - ADDTL 15 MIN: Performed by: INTERNAL MEDICINE

## 2020-08-27 PROCEDURE — 85025 COMPLETE CBC W/AUTO DIFF WBC: CPT

## 2020-08-27 PROCEDURE — 3700000001 HC ADD 15 MINUTES (ANESTHESIA): Performed by: INTERNAL MEDICINE

## 2020-08-27 PROCEDURE — C9113 INJ PANTOPRAZOLE SODIUM, VIA: HCPCS | Performed by: INTERNAL MEDICINE

## 2020-08-27 PROCEDURE — 6360000002 HC RX W HCPCS

## 2020-08-27 PROCEDURE — 0DB28ZX EXCISION OF MIDDLE ESOPHAGUS, VIA NATURAL OR ARTIFICIAL OPENING ENDOSCOPIC, DIAGNOSTIC: ICD-10-PCS | Performed by: INTERNAL MEDICINE

## 2020-08-27 PROCEDURE — 6360000002 HC RX W HCPCS: Performed by: NURSE ANESTHETIST, CERTIFIED REGISTERED

## 2020-08-27 PROCEDURE — 83735 ASSAY OF MAGNESIUM: CPT

## 2020-08-27 PROCEDURE — 2709999900 HC NON-CHARGEABLE SUPPLY: Performed by: INTERNAL MEDICINE

## 2020-08-27 PROCEDURE — 2580000003 HC RX 258

## 2020-08-27 PROCEDURE — 3609012400 HC EGD TRANSORAL BIOPSY SINGLE/MULTIPLE: Performed by: INTERNAL MEDICINE

## 2020-08-27 PROCEDURE — 2060000000 HC ICU INTERMEDIATE R&B

## 2020-08-27 PROCEDURE — 2580000003 HC RX 258: Performed by: INTERNAL MEDICINE

## 2020-08-27 PROCEDURE — 2580000003 HC RX 258: Performed by: ANESTHESIOLOGY

## 2020-08-27 PROCEDURE — 88305 TISSUE EXAM BY PATHOLOGIST: CPT

## 2020-08-27 PROCEDURE — 3700000000 HC ANESTHESIA ATTENDED CARE: Performed by: INTERNAL MEDICINE

## 2020-08-27 PROCEDURE — 6360000002 HC RX W HCPCS: Performed by: INTERNAL MEDICINE

## 2020-08-27 PROCEDURE — 7100000010 HC PHASE II RECOVERY - FIRST 15 MIN: Performed by: INTERNAL MEDICINE

## 2020-08-27 PROCEDURE — 80048 BASIC METABOLIC PNL TOTAL CA: CPT

## 2020-08-27 RX ORDER — PANTOPRAZOLE SODIUM 40 MG/10ML
40 INJECTION, POWDER, LYOPHILIZED, FOR SOLUTION INTRAVENOUS DAILY
Status: DISCONTINUED | OUTPATIENT
Start: 2020-08-28 | End: 2020-08-28 | Stop reason: HOSPADM

## 2020-08-27 RX ORDER — SODIUM CHLORIDE, SODIUM LACTATE, POTASSIUM CHLORIDE, CALCIUM CHLORIDE 600; 310; 30; 20 MG/100ML; MG/100ML; MG/100ML; MG/100ML
INJECTION, SOLUTION INTRAVENOUS CONTINUOUS
Status: DISCONTINUED | OUTPATIENT
Start: 2020-08-27 | End: 2020-08-27

## 2020-08-27 RX ORDER — PROPOFOL 10 MG/ML
INJECTION, EMULSION INTRAVENOUS PRN
Status: DISCONTINUED | OUTPATIENT
Start: 2020-08-27 | End: 2020-08-27 | Stop reason: SDUPTHER

## 2020-08-27 RX ADMIN — PANTOPRAZOLE SODIUM 40 MG: 40 INJECTION, POWDER, FOR SOLUTION INTRAVENOUS at 09:38

## 2020-08-27 RX ADMIN — PIPERACILLIN AND TAZOBACTAM 3.38 G: 3; .375 INJECTION, POWDER, LYOPHILIZED, FOR SOLUTION INTRAVENOUS at 01:45

## 2020-08-27 RX ADMIN — PROPOFOL 50 MG: 10 INJECTION, EMULSION INTRAVENOUS at 13:22

## 2020-08-27 RX ADMIN — PROPOFOL 50 MG: 10 INJECTION, EMULSION INTRAVENOUS at 13:19

## 2020-08-27 RX ADMIN — ENOXAPARIN SODIUM 40 MG: 40 INJECTION SUBCUTANEOUS at 09:37

## 2020-08-27 RX ADMIN — PIPERACILLIN AND TAZOBACTAM 3.38 G: 3; .375 INJECTION, POWDER, LYOPHILIZED, FOR SOLUTION INTRAVENOUS at 09:37

## 2020-08-27 RX ADMIN — PROPOFOL 50 MG: 10 INJECTION, EMULSION INTRAVENOUS at 13:26

## 2020-08-27 RX ADMIN — SODIUM CHLORIDE, POTASSIUM CHLORIDE, SODIUM LACTATE AND CALCIUM CHLORIDE: 600; 310; 30; 20 INJECTION, SOLUTION INTRAVENOUS at 06:23

## 2020-08-27 RX ADMIN — AMPICILLIN SODIUM AND SULBACTAM SODIUM 1.5 G: 1; .5 INJECTION, POWDER, FOR SOLUTION INTRAMUSCULAR; INTRAVENOUS at 17:51

## 2020-08-27 RX ADMIN — CHLORASEPTIC 1 SPRAY: 1.5 LIQUID ORAL at 01:50

## 2020-08-27 RX ADMIN — CHLORASEPTIC 1 SPRAY: 1.5 LIQUID ORAL at 06:26

## 2020-08-27 RX ADMIN — PROPOFOL 50 MG: 10 INJECTION, EMULSION INTRAVENOUS at 13:16

## 2020-08-27 RX ADMIN — AMPICILLIN SODIUM AND SULBACTAM SODIUM 1.5 G: 1; .5 INJECTION, POWDER, FOR SOLUTION INTRAMUSCULAR; INTRAVENOUS at 23:46

## 2020-08-27 RX ADMIN — Medication 10 ML: at 09:38

## 2020-08-27 ASSESSMENT — PULMONARY FUNCTION TESTS
PIF_VALUE: 0
PIF_VALUE: 1
PIF_VALUE: 0

## 2020-08-27 ASSESSMENT — PAIN SCALES - GENERAL
PAINLEVEL_OUTOF10: 0

## 2020-08-27 ASSESSMENT — PAIN - FUNCTIONAL ASSESSMENT
PAIN_FUNCTIONAL_ASSESSMENT: 0-10

## 2020-08-27 ASSESSMENT — ENCOUNTER SYMPTOMS: SHORTNESS OF BREATH: 1

## 2020-08-27 NOTE — PROCEDURES
hernia. Esophagus: Normal. No Evidence of Castelan's, esophagitis, mass, stricture.  Mid region biopsied to evaluate for EoE    Estimated blood loss trace    Plan:  Daily PPI  If symptoms persist consider Esophagram to evaluate motility  Will sign off please recall with questions  The patient knows it is their responsibility to call for biopsy results in 7 days

## 2020-08-27 NOTE — CARE COORDINATION
Case Management Assessment           Daily Note                 Date/ Time of Note: 8/27/2020 1:27 PM         Note completed by: Munira Cisse    Patient Name: Lazara Montague  YOB: 1955    Diagnosis:Dyspnea [R06.00]  Dyspnea [R06.00]  Patient Admission Status: Inpatient    Date of Lowell Morse 10:19 AM Length of Stay: 2 GLOS:      Current Plan of Care: EGD today  ________________________________________________________________________________________  PT AM-PAC:   / 24 per last evaluation on:     OT AM-PAC:   / 24 per last evaluation on:     DME Needs for discharge: n/a  ________________________________________________________________________________________  Discharge Plan: Home    Tentative discharge date: TBD    Current barriers to discharge: medical clearance    Referrals completed: Not Applicable    Resources/ information provided: Not indicated at this time  ________________________________________________________________________________________  Case Management Notes: Patient is from home alone, independent pta. No CM needs at this time. Maris Kim and his family were provided with choice of provider; he and his family are in agreement with the discharge plan.     Care Transition Patient: No    Munira Cisse RN  [facility]  Case Management Department  Ph: [phone]  Fax: [phone]

## 2020-08-27 NOTE — PROGRESS NOTES
Pt's daughter Yuri Echevaria updated on pt's current status and plan of care. All questions answered.  Electronically signed by Sulaiman Gale RN on 8/27/2020 at 7:45 AM
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
## 2020-08-27 NOTE — PLAN OF CARE
Problem: Skin Integrity:  Goal: Skin integrity will stabilize  Description: Skin integrity will stabilize  8/27/2020 0326 by Claudia Lozada RN  Outcome: Ongoing  Note: Ceasar skin assessment completed overnight, pt scored a 19. No signs of new skin breakdown noted. Pt has remained continent of bowel and bladder overnight. Pt turns self. Will continue to monitor. Problem: Pain:  Goal: Pain level will decrease  Description: Pain level will decrease  8/27/2020 0326 by Claudia Lozada RN  Outcome: Ongoing  Note: Thus far this shift pt has had no complaints of pain. Will continue to monitor.

## 2020-08-27 NOTE — PLAN OF CARE
Problem: Pain:  Goal: Pain level will decrease  Description: Pain level will decrease  8/27/2020 4104 by John Ramires RN  Outcome: Ongoing     Problem: Pain:  Goal: Control of acute pain  Description: Control of acute pain  Outcome: Ongoing     Problem: Pain:  Goal: Control of chronic pain  Description: Control of chronic pain  Outcome: Ongoing     Problem: Pain:  Goal: Patient's pain/discomfort is manageable  Description: Patient's pain/discomfort is manageable  Outcome: Ongoing     Problem: Falls - Risk of:  Goal: Will remain free from falls  Description: Will remain free from falls  Outcome: Ongoing     Problem: Infection:  Goal: Will remain free from infection  Description: Will remain free from infection  Outcome: Ongoing     Problem: Safety:  Goal: Free from accidental physical injury  Description: Free from accidental physical injury  Outcome: Ongoing     Problem: Daily Care:  Goal: Daily care needs are met  Description: Daily care needs are met  Outcome: Ongoing     Problem: Discharge Planning:  Goal: Patients continuum of care needs are met  Description: Patients continuum of care needs are met  Outcome: Ongoing     Problem: Gas Exchange - Impaired  Goal: Absence of hypoxia  Outcome: Ongoing     Problem: Nutrition  Goal: Optimal nutrition therapy  Outcome: Ongoing

## 2020-08-27 NOTE — ANESTHESIA POSTPROCEDURE EVALUATION
Department of Anesthesiology  Postprocedure Note    Patient: Claude Munda  MRN: [id number]  YOB: 1955  Date of evaluation: 8/27/2020  Time:  2:04 PM     Procedure Summary     Date:  08/27/20 Room / Location:  [address] [place] [address] Nely Call  / [facility]    Anesthesia Start:  7129 Anesthesia Stop:  5166    Procedure:  EGD BIOPSY (N/A ) Diagnosis:  (DYSPHAGIA)    Surgeon:  Osvaldo Clifford MD Responsible Provider:  Loly Haque MD    Anesthesia Type:  general ASA Status:  3          Anesthesia Type: No value filed. Katie Phase I: Katie Score: 10    Katie Phase II: Katie Score: 9    Last vitals: Reviewed and per EMR flowsheets.        Anesthesia Post Evaluation    Patient location during evaluation: PACU  Patient participation: complete - patient participated  Level of consciousness: awake and alert  Pain score: 0  Airway patency: patent  Nausea & Vomiting: no nausea and no vomiting  Complications: no  Cardiovascular status: hemodynamically stable  Respiratory status: acceptable  Hydration status: euvolemic

## 2020-08-27 NOTE — ANESTHESIA PRE PROCEDURE
Department of Anesthesiology  Preprocedure Note       Name:  Jennifer Howard   Age:  63 y.o.  :  1955                                          MRN:  [id number]         Date:  2020      Surgeon: Isma Calabrese):  Codey Beckett MD    Procedure: Procedure(s):  ESOPHAGOGASTRODUODENOSCOPY    Medications prior to admission:   Prior to Admission medications    Not on File       Current medications:    Current Facility-Administered Medications   Medication Dose Route Frequency Provider Last Rate Last Dose    lactated ringers infusion   Intravenous Continuous Yodit Ott  mL/hr at 20 9512      phenol 1.4 % mouth spray 1 spray  1 spray Mouth/Throat Q2H PRN Shannon Huang MD   1 spray at 20 0626    sodium chloride flush 0.9 % injection 10 mL  10 mL Intravenous 2 times per day Willi Castañeda MD   10 mL at 20 0062    sodium chloride flush 0.9 % injection 10 mL  10 mL Intravenous PRN Willi Castañeda MD        acetaminophen (TYLENOL) tablet 650 mg  650 mg Oral Q6H PRN Willi Castañeda MD   650 mg at 20 1634    Or    acetaminophen (TYLENOL) suppository 650 mg  650 mg Rectal Q6H PRN Willi Castañeda MD        polyethylene glycol (GLYCOLAX) packet 17 g  17 g Oral Daily PRN Willi Castañeda MD        promethazine (PHENERGAN) tablet 12.5 mg  12.5 mg Oral Q6H PRN Willi Castañeda MD        Or    ondansetron [facility]) injection 4 mg  4 mg Intravenous Q6H PRN Willi Castañeda MD        enoxaparin (LOVENOX) injection 40 mg  40 mg Subcutaneous Daily Willi Castañeda MD   40 mg at 20 8833    pantoprazole (PROTONIX) injection 40 mg  40 mg Intravenous BID Hawa Razo MD   40 mg at 20 9157    dextrose 5 % in lactated ringers infusion   Intravenous Continuous Hawa Razo MD   Stopped at 20 0620    piperacillin-tazobactam (ZOSYN) 3.375 g in dextrose 5 % 100 mL IVPB extended infusion (mini-bag)  3.375 g Intravenous Q8H Lynnette H MD Juan Daniel 25 mL/hr at 08/27/20 0937 3.375 g at 08/27/20 5210       Allergies:  No Known Allergies    Problem List:    Patient Active Problem List   Diagnosis Code    Dyspnea R06.00       Past Medical History:  History reviewed. No pertinent past medical history. Past Surgical History:  No past surgical history on file. Social History:    Social History     Tobacco Use    Smoking status: Not on file   Substance Use Topics    Alcohol use: Not on file                                Counseling given: Not Answered      Vital Signs (Current):   Vitals:    08/27/20 0143 08/27/20 0617 08/27/20 0928 08/27/20 1109   BP: 118/65 117/65 106/68 102/63   Pulse: 65 60 72 72   Resp: 20 18 18 18   Temp: 98.2 °F (36.8 °C) 98.8 °F (37.1 °C) 98.3 °F (36.8 °C) 98.4 °F (36.9 °C)   TempSrc: Oral Oral Oral Oral   SpO2: 98% 93% 92% 94%   Weight:       Height:                                                  BP Readings from Last 3 Encounters:   08/27/20 102/63       NPO Status:                                                                                 BMI:   Wt Readings from Last 3 Encounters:   08/26/20 134 lb 0.6 oz (60.8 kg)     Body mass index is 24.52 kg/m².     CBC:   Lab Results   Component Value Date    WBC 10.7 08/27/2020    RBC 4.66 08/27/2020    HGB 15.1 08/27/2020    HCT 44.1 08/27/2020    MCV 94.6 08/27/2020    RDW 12.5 08/27/2020     08/27/2020       CMP:   Lab Results   Component Value Date     08/27/2020    K 3.5 08/27/2020     08/27/2020    CO2 21 08/27/2020    BUN 5 08/27/2020    CREATININE 0.9 08/27/2020    GFRAA >60 08/27/2020    LABGLOM >60 08/27/2020    GLUCOSE 111 08/27/2020    CALCIUM 9.0 08/27/2020       POC Tests:   Recent Labs     08/27/20  0619   POCGLU 117*       Coags: No results found for: PROTIME, INR, APTT    HCG (If Applicable): No results found for: PREGTESTUR, PREGSERUM, HCG, HCGQUANT     ABGs: No results found for: PHART, PO2ART, AMN8PXB, UHR8QTH, BEART, G0GOGAND     Type & Screen (If Applicable):  No results found for: LABABO, LABRH    Drug/Infectious Status (If Applicable):  No results found for: HIV, HEPCAB    COVID-19 Screening (If Applicable):   Lab Results   Component Value Date    COVID19 Not Detected 08/25/2020         Anesthesia Evaluation  Patient summary reviewed and Nursing notes reviewed  Airway: Mallampati: II  TM distance: >3 FB   Neck ROM: full  Mouth opening: > = 3 FB Dental:          Pulmonary:   (+) shortness of breath:                             Cardiovascular:Negative CV ROS                      Neuro/Psych:   Negative Neuro/Psych ROS              GI/Hepatic/Renal: Neg GI/Hepatic/Renal ROS            Endo/Other: Negative Endo/Other ROS                    Abdominal:           Vascular: negative vascular ROS. Anesthesia Plan      general     ASA 1    (19-year-old male presents for esophagogastroduodenoscopy. Plan general anesthesia with ASA standard monitors. Questions answered. Patient agreeable with anesthetic plan.  )  Induction: intravenous. Anesthetic plan and risks discussed with patient. Plan discussed with CRNA.     Attending anesthesiologist reviewed and agrees with Pre Eval content        Gonzalo Spencer MD   8/27/2020

## 2020-08-28 VITALS
DIASTOLIC BLOOD PRESSURE: 58 MMHG | HEART RATE: 65 BPM | HEIGHT: 62 IN | OXYGEN SATURATION: 95 % | WEIGHT: 134.04 LBS | SYSTOLIC BLOOD PRESSURE: 102 MMHG | RESPIRATION RATE: 15 BRPM | TEMPERATURE: 97.9 F | BODY MASS INDEX: 24.67 KG/M2

## 2020-08-28 PROBLEM — J14 PNEUMONIA DUE TO HAEMOPHILUS INFLUENZAE (HCC): Status: ACTIVE | Noted: 2020-08-28

## 2020-08-28 PROBLEM — B34.8 RHINOVIRUS INFECTION: Status: ACTIVE | Noted: 2020-08-28

## 2020-08-28 LAB
ANION GAP SERPL CALCULATED.3IONS-SCNC: 10 MMOL/L (ref 3–16)
BASOPHILS ABSOLUTE: 0.1 K/UL (ref 0–0.2)
BASOPHILS RELATIVE PERCENT: 0.9 %
BUN BLDV-MCNC: 3 MG/DL (ref 7–20)
CALCIUM SERPL-MCNC: 8.5 MG/DL (ref 8.3–10.6)
CHLORIDE BLD-SCNC: 105 MMOL/L (ref 99–110)
CO2: 22 MMOL/L (ref 21–32)
CREAT SERPL-MCNC: 0.7 MG/DL (ref 0.8–1.3)
CULTURE, RESPIRATORY: ABNORMAL
CULTURE, RESPIRATORY: ABNORMAL
EOSINOPHILS ABSOLUTE: 0.1 K/UL (ref 0–0.6)
EOSINOPHILS RELATIVE PERCENT: 1.2 %
GFR AFRICAN AMERICAN: >60
GFR NON-AFRICAN AMERICAN: >60
GLUCOSE BLD-MCNC: 121 MG/DL (ref 70–99)
GRAM STAIN RESULT: ABNORMAL
HCT VFR BLD CALC: 42.6 % (ref 40.5–52.5)
HEMOGLOBIN: 14.5 G/DL (ref 13.5–17.5)
LYMPHOCYTES ABSOLUTE: 2.1 K/UL (ref 1–5.1)
LYMPHOCYTES RELATIVE PERCENT: 25.6 %
MAGNESIUM: 1.9 MG/DL (ref 1.8–2.4)
MCH RBC QN AUTO: 32.7 PG (ref 26–34)
MCHC RBC AUTO-ENTMCNC: 34.1 G/DL (ref 31–36)
MCV RBC AUTO: 96.1 FL (ref 80–100)
MONOCYTES ABSOLUTE: 1.1 K/UL (ref 0–1.3)
MONOCYTES RELATIVE PERCENT: 13 %
NEUTROPHILS ABSOLUTE: 5 K/UL (ref 1.7–7.7)
NEUTROPHILS RELATIVE PERCENT: 59.3 %
ORGANISM: ABNORMAL
PDW BLD-RTO: 12.6 % (ref 12.4–15.4)
PLATELET # BLD: 260 K/UL (ref 135–450)
PMV BLD AUTO: 8.4 FL (ref 5–10.5)
POTASSIUM REFLEX MAGNESIUM: 3.4 MMOL/L (ref 3.5–5.1)
RBC # BLD: 4.43 M/UL (ref 4.2–5.9)
SODIUM BLD-SCNC: 137 MMOL/L (ref 136–145)
WBC # BLD: 8.4 K/UL (ref 4–11)

## 2020-08-28 PROCEDURE — 80048 BASIC METABOLIC PNL TOTAL CA: CPT

## 2020-08-28 PROCEDURE — 83735 ASSAY OF MAGNESIUM: CPT

## 2020-08-28 PROCEDURE — 2580000003 HC RX 258

## 2020-08-28 PROCEDURE — 2580000003 HC RX 258: Performed by: INTERNAL MEDICINE

## 2020-08-28 PROCEDURE — 85025 COMPLETE CBC W/AUTO DIFF WBC: CPT

## 2020-08-28 PROCEDURE — C9113 INJ PANTOPRAZOLE SODIUM, VIA: HCPCS | Performed by: INTERNAL MEDICINE

## 2020-08-28 PROCEDURE — 6370000000 HC RX 637 (ALT 250 FOR IP): Performed by: INTERNAL MEDICINE

## 2020-08-28 PROCEDURE — 6360000002 HC RX W HCPCS: Performed by: INTERNAL MEDICINE

## 2020-08-28 PROCEDURE — 6360000002 HC RX W HCPCS

## 2020-08-28 RX ORDER — AMOXICILLIN AND CLAVULANATE POTASSIUM 875; 125 MG/1; MG/1
1 TABLET, FILM COATED ORAL 2 TIMES DAILY
Qty: 14 TABLET | Refills: 0 | Status: SHIPPED | OUTPATIENT
Start: 2020-08-28 | End: 2020-09-02

## 2020-08-28 RX ORDER — PANTOPRAZOLE SODIUM 20 MG/1
40 TABLET, DELAYED RELEASE ORAL DAILY
Qty: 30 TABLET | Refills: 3 | Status: SHIPPED | OUTPATIENT
Start: 2020-08-28

## 2020-08-28 RX ORDER — POTASSIUM CHLORIDE 20 MEQ/1
40 TABLET, EXTENDED RELEASE ORAL ONCE
Status: COMPLETED | OUTPATIENT
Start: 2020-08-28 | End: 2020-08-28

## 2020-08-28 RX ORDER — GUAIFENESIN AND CODEINE PHOSPHATE 100; 10 MG/5ML; MG/5ML
5 SOLUTION ORAL 4 TIMES DAILY PRN
Qty: 118 ML | Refills: 0 | Status: SHIPPED | OUTPATIENT
Start: 2020-08-28 | End: 2020-09-03

## 2020-08-28 RX ADMIN — PANTOPRAZOLE SODIUM 40 MG: 40 INJECTION, POWDER, FOR SOLUTION INTRAVENOUS at 08:29

## 2020-08-28 RX ADMIN — SODIUM CHLORIDE, SODIUM LACTATE, POTASSIUM CHLORIDE, CALCIUM CHLORIDE AND DEXTROSE MONOHYDRATE: 5; 600; 310; 30; 20 INJECTION, SOLUTION INTRAVENOUS at 05:50

## 2020-08-28 RX ADMIN — Medication 10 ML: at 12:09

## 2020-08-28 RX ADMIN — ENOXAPARIN SODIUM 40 MG: 40 INJECTION SUBCUTANEOUS at 08:28

## 2020-08-28 RX ADMIN — POTASSIUM CHLORIDE 40 MEQ: 20 TABLET, EXTENDED RELEASE ORAL at 11:12

## 2020-08-28 RX ADMIN — AMPICILLIN SODIUM AND SULBACTAM SODIUM 1.5 G: 1; .5 INJECTION, POWDER, FOR SOLUTION INTRAMUSCULAR; INTRAVENOUS at 05:50

## 2020-08-28 RX ADMIN — AMPICILLIN SODIUM AND SULBACTAM SODIUM 1.5 G: 1; .5 INJECTION, POWDER, FOR SOLUTION INTRAMUSCULAR; INTRAVENOUS at 12:08

## 2020-08-28 RX ADMIN — Medication 10 ML: at 08:29

## 2020-08-28 ASSESSMENT — PAIN SCALES - GENERAL
PAINLEVEL_OUTOF10: 0

## 2020-08-28 NOTE — PROGRESS NOTES
Reviewed discharge instructions with pt who verbalized understanding and willingness to comply. Uneventfully removed remaining two saline locks. Sites are unremarkable. Bandage to sites. Patient was escorted off the unit via wheelchair. All personal items were released with patient at time of discharge. Discharged home as ordered.  Pres[facility] were retrieved from pharmacy [facility]>

## 2020-08-28 NOTE — PROGRESS NOTES
Patient was ambulating to the bathroom and accidentally dislodged IV. IV site was cleaned and covered with a dry sterile dressing. Patient was also inquiring about a PCP. Notified  who will follow-up with patient.
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
## 2020-08-28 NOTE — PLAN OF CARE
Problem: Falls - Risk of:  Goal: Will remain free from falls  Description: Will remain free from falls  Outcome: Ongoing     Problem: Infection:  Goal: Will remain free from infection  Description: Will remain free from infection  Outcome: Ongoing     Problem: Safety:  Goal: Free from accidental physical injury  Description: Free from accidental physical injury  Outcome: Ongoing     Problem: Skin Integrity:  Goal: Skin integrity will stabilize  Description: Skin integrity will stabilize  Outcome: Ongoing

## 2020-08-28 NOTE — PROGRESS NOTES
Internal Medicine PGY-1 Resident Progress Note        PCP: No primary care provider on file. Date of Admission: 8/25/2020    Chief Complaint: Chest pain and SOB    Interval History:   Patient is lying comfortably in bed, no acute events overnight. Denies any new complaints. Requesting food. Denies F/C/N/V/SOB. Has non cardiac CP with cough. States that he feels much better since admission. Medications:  Reviewed  Medications    dextrose 5% in lactated ringers 50 mL/hr at 08/28/20 0550     Scheduled Medications    pantoprazole  40 mg Intravenous Daily    ampicillin-sulbactam  1.5 g Intravenous Q6H    sodium chloride flush  10 mL Intravenous 2 times per day    enoxaparin  40 mg Subcutaneous Daily     PRN Meds: phenol, sodium chloride flush, acetaminophen **OR** acetaminophen, polyethylene glycol, promethazine **OR** ondansetron      Intake/Output Summary (Last 24 hours) at 8/28/2020 0748  Last data filed at 8/28/2020 0438  Gross per 24 hour   Intake 900 ml   Output 3230 ml   Net -2330 ml       Physical Exam Performed:    /76   Pulse 63   Temp 98.9 °F (37.2 °C) (Oral)   Resp 17   Ht 5' 2\" (1.575 m)   Wt 134 lb 0.6 oz (60.8 kg)   SpO2 97%   BMI 24.52 kg/m²     Physical Exam  Constitutional:       Appearance: Normal appearance. He is cachectic. HENT:      Head: Normocephalic and atraumatic. Cardiovascular:      Rate and Rhythm: Normal rate and regular rhythm. Heart sounds: No murmur. No friction rub. No gallop. Pulmonary:      Effort: Pulmonary effort is normal.      Breath sounds: Normal breath sounds. No decreased breath sounds, wheezing, rhonchi or rales. Abdominal:      General: Abdomen is flat. Bowel sounds are normal.      Palpations: Abdomen is soft. Tenderness: There is no abdominal tenderness. Musculoskeletal:      Right lower leg: No edema. Left lower leg: No edema. Skin:     General: Skin is warm and dry. Nails: There is clubbing.    Neurological: enterovirus  -respiratory cultures: 4+ WBC, 1+ gram pos rods, + H. Influenzae   -d/c zosyn, started on Unasyn 1.5g IV q6H  -Cefepime 1g IV once     Odynophagia 2/2 CMV esophagitis vs candidal esphagitis   -Patient has a long hx of smoking and ETOH abuse, and a a recent 20lb weight loss, possible esophageal neoplasm  -EGD:   -Duodenum: Normal   -Stomach: Minimal antritis. Retroflexion did not show a hiatal hernia. -Esophagus: Normal. No Evidence of Castelan's, esophagitis, mass, stricture.  Mid region biopsied t  to evaluate for EoE-   -HIV panel: negative   -speech pathology: regular diet, thin liquid     Tobacco abuse  -  patient on cessation  - nicotine patch if needed     ETOH abuse  -monitor signs of withdrawal   - on cessation     Crack cocaine abuse  Patient endorses CP, trop <0.01, not likely cardiac in origin  - patient on cessation        DVT Prophylaxis: Lovenox 40mg daily IV  GI PPX: Protonix 40mg IV BID  Diet: Diet NPO Effective Now,   Code Status: Full Code        Dispo - inpatient     I will discuss the patient with the senior resident Waylan Duverney, MD Jamel Skipper,   Internal Medicine Resident, PGY-1

## 2020-08-28 NOTE — PLAN OF CARE
Problem: Pain:  Goal: Pain level will decrease  Description: Pain level will decrease  Outcome: Ongoing     Problem: Pain:  Goal: Control of acute pain  Description: Control of acute pain  Outcome: Ongoing     Problem: Pain:  Goal: Control of chronic pain  Description: Control of chronic pain  Outcome: Ongoing     Problem: Pain:  Goal: Patient's pain/discomfort is manageable  Description: Patient's pain/discomfort is manageable  Outcome: Ongoing     Problem: Falls - Risk of:  Goal: Will remain free from falls  Description: Will remain free from falls  8/28/2020 0840 by Andie Bell RN  Outcome: Ongoing  8/28/2020 0724 by Cassie Cruz RN  Outcome: Ongoing     Problem: Falls - Risk of:  Goal: Absence of physical injury  Description: Absence of physical injury  Outcome: Ongoing     Problem: Infection:  Goal: Will remain free from infection  Description: Will remain free from infection  8/28/2020 0840 by Andie Bell RN  Outcome: Ongoing     Problem: Safety:  Goal: Free from accidental physical injury  Description: Free from accidental physical injury  8/28/2020 0840 by Andie Bell RN  Outcome: Ongoing     Problem: Safety:  Goal: Free from intentional harm  Description: Free from intentional harm  Outcome: Ongoing     Problem: Daily Care:  Goal: Daily care needs are met  Description: Daily care needs are met  Outcome: Ongoing     Problem: Skin Integrity:  Goal: Skin integrity will stabilize  Description: Skin integrity will stabilize  8/28/2020 0840 by Andie Bell RN  Outcome: Ongoing     Problem: Discharge Planning:  Goal: Patients continuum of care needs are met  Description: Patients continuum of care needs are met  Outcome: Ongoing     Problem: Gas Exchange - Impaired  Goal: Absence of hypoxia  Outcome: Ongoing     Problem: Gas Exchange - Impaired  Goal: Promote optimal lung function  Outcome: Ongoing     Problem: Nutrition  Goal: Optimal nutrition therapy  Outcome: Ongoing

## 2020-08-31 NOTE — PROGRESS NOTES
Physician Progress Note      PATIENT:               Otis Nick  CSN #:                  [id number]  :                       1955  ADMIT DATE:       2020 10:19 AM  DISCH DATE:        2020 4:00 PM  RESPONDING  PROVIDER #:        Rhiannon Bro MD          QUERY TEXT:    Pt admitted with dyspnea . Pt noted to have possible sepsis. If possible,   please document in the progress notes and discharge summary if you are   evaluating and /or treating any of the following: The medical record reflects the following:  Risk Factors: 62 yo w/ chills, night sweats  Clinical Indicators: on admit WBC 20.6, HR 99, RR 22.  PN : He is   ill-appearing. Shortness of breath 2/2 R aspiration PNA vs pneumanitis  Treatment: IV Cefepime X1, IV Zosy now dc'd, IV vanc X1, IV Unasyn  Options provided:  -- No Sepsis, pneumonia only  -- Sepsis 2/2 pneumonia, present on admission  -- Other - I will add my own diagnosis  -- Disagree - Not applicable / Not valid  -- Disagree - Clinically unable to determine / Unknown  -- Refer to Clinical Documentation Reviewer    PROVIDER RESPONSE TEXT:    This patient has sepsis 2/2 pneumonia which was present on admission.     Query created by: Claudia Edwards on 2020 8:12 AM      Electronically signed by:  Rhiannon Bro MD 2020 11:47 PM

## (undated) DEVICE — FORCEPS BX L240CM JAW DIA2.4MM ORNG L CAP W/ NDL DISP RAD